# Patient Record
Sex: MALE | URBAN - NONMETROPOLITAN AREA
[De-identification: names, ages, dates, MRNs, and addresses within clinical notes are randomized per-mention and may not be internally consistent; named-entity substitution may affect disease eponyms.]

---

## 2024-02-07 ENCOUNTER — APPOINTMENT (RX ONLY)
Dept: URBAN - NONMETROPOLITAN AREA CLINIC 30 | Facility: CLINIC | Age: 29
Setting detail: DERMATOLOGY
End: 2024-02-07

## 2024-02-07 VITALS — HEIGHT: 64 IN | WEIGHT: 180 LBS

## 2024-02-07 DIAGNOSIS — Z71.89 OTHER SPECIFIED COUNSELING: ICD-10-CM

## 2024-02-07 DIAGNOSIS — D18.0 HEMANGIOMA: ICD-10-CM | Status: STABLE

## 2024-02-07 DIAGNOSIS — D22 MELANOCYTIC NEVI: ICD-10-CM | Status: INADEQUATELY CONTROLLED

## 2024-02-07 DIAGNOSIS — L81.4 OTHER MELANIN HYPERPIGMENTATION: ICD-10-CM | Status: STABLE

## 2024-02-07 DIAGNOSIS — L64.8 OTHER ANDROGENIC ALOPECIA: ICD-10-CM | Status: STABLE

## 2024-02-07 PROBLEM — D48.5 NEOPLASM OF UNCERTAIN BEHAVIOR OF SKIN: Status: ACTIVE | Noted: 2024-02-07

## 2024-02-07 PROBLEM — D18.01 HEMANGIOMA OF SKIN AND SUBCUTANEOUS TISSUE: Status: ACTIVE | Noted: 2024-02-07

## 2024-02-07 PROCEDURE — ? BIOPSY BY SHAVE METHOD

## 2024-02-07 PROCEDURE — ? ADDITIONAL NOTES

## 2024-02-07 PROCEDURE — 11102 TANGNTL BX SKIN SINGLE LES: CPT

## 2024-02-07 PROCEDURE — 99203 OFFICE O/P NEW LOW 30 MIN: CPT | Mod: 25

## 2024-02-07 PROCEDURE — ? EDUCATIONAL RESOURCES PROVIDED

## 2024-02-07 PROCEDURE — ? TREATMENT REGIMEN

## 2024-02-07 PROCEDURE — ? COUNSELING

## 2024-02-07 ASSESSMENT — LOCATION SIMPLE DESCRIPTION DERM
LOCATION SIMPLE: HAIR
LOCATION SIMPLE: LEFT SCALP
LOCATION SIMPLE: RIGHT FOREHEAD
LOCATION SIMPLE: RIGHT SCALP
LOCATION SIMPLE: ANTERIOR SCALP

## 2024-02-07 ASSESSMENT — LOCATION DETAILED DESCRIPTION DERM
LOCATION DETAILED: RIGHT MEDIAL FRONTAL SCALP
LOCATION DETAILED: HAIR
LOCATION DETAILED: LEFT CENTRAL FRONTAL SCALP
LOCATION DETAILED: RIGHT SUPERIOR FOREHEAD
LOCATION DETAILED: MID-FRONTAL SCALP

## 2024-02-07 ASSESSMENT — LOCATION ZONE DERM
LOCATION ZONE: FACE
LOCATION ZONE: SCALP

## 2024-02-07 NOTE — PROCEDURE: TREATMENT REGIMEN
Detail Level: Simple
Otc Regimen: Strataderm cream and silicone
Otc Regimen: Men’s rogaine
Plan: Discuss finasteride

## 2024-02-07 NOTE — PROCEDURE: ADDITIONAL NOTES
Detail Level: Simple
Additional Notes: Pt stated daughter was diagnosed with eczema. Provided pt with handout and informed him Freeman can see her.
Render Risk Assessment In Note?: no